# Patient Record
Sex: FEMALE | Race: WHITE | Employment: OTHER | URBAN - METROPOLITAN AREA
[De-identification: names, ages, dates, MRNs, and addresses within clinical notes are randomized per-mention and may not be internally consistent; named-entity substitution may affect disease eponyms.]

---

## 2020-08-11 ENCOUNTER — HOSPITAL ENCOUNTER (OUTPATIENT)
Age: 50
Setting detail: SPECIMEN
Discharge: HOME | End: 2020-08-11
Payer: COMMERCIAL

## 2020-08-11 ENCOUNTER — OFFICE VISIT (OUTPATIENT)
Dept: PRIMARY CARE CLINIC | Age: 50
End: 2020-08-11
Payer: COMMERCIAL

## 2020-08-11 VITALS — OXYGEN SATURATION: 99 % | TEMPERATURE: 97 F | HEART RATE: 76 BPM

## 2020-08-11 PROCEDURE — 99213 OFFICE O/P EST LOW 20 MIN: CPT | Performed by: NURSE PRACTITIONER

## 2020-08-11 PROCEDURE — U0002 COVID-19 LAB TEST NON-CDC: HCPCS

## 2020-08-11 NOTE — PATIENT INSTRUCTIONS
Your COVID 19 test can take 3-5 days for the results come back. We ask that you make a Mychart page and view your test results this way. You will need to Self quarantine until you know your results. Increase fluids rest  Saline nasal spray as directed  Warm salt gargles for throat discomfort  Monitor temperature twice a day  Tylenol for fevers and/or discomfort. If symptoms are worse -Go to the ER. Follow up with your primary doctor    To Whom it May Concern:    Bashir Reed has been tested for COVID on 08/11/20. They may NOT return to work until their lab test results back and they been fever free for 3 days. If test is positive they must stay home for 2 weeks or until they test negative or as directed by the [facility] Department.

## 2020-08-11 NOTE — PROGRESS NOTES
8/11/2020    HPI:  Chief complaint and history of present illness as per medical assistant/nurse documented today in the Flu/COVID-19 clinic. MEDICATIONS:  Prior to Visit Medications    Not on File       Allergies not on file, No past medical history on file., No past surgical history on file.,   Social History     Tobacco Use    Smoking status: Not on file   Substance Use Topics    Alcohol use: Not on file    Drug use: Not on file   , No family history on file.,   There is no immunization history on file for this patient.,   Health Maintenance   Topic Date Due    HIV screen  04/29/1985    DTaP/Tdap/Td vaccine (1 - Tdap) 04/29/1989    Cervical cancer screen  04/29/1991    Breast cancer screen  04/29/2020    Shingles Vaccine (1 of 2) 04/29/2020    Colon cancer screen colonoscopy  04/29/2020    Lipid screen  06/02/2020    Flu vaccine (1) 09/01/2020    Hepatitis A vaccine  Aged Out    Hepatitis B vaccine  Aged Out    Hib vaccine  Aged Out    Meningococcal (ACWY) vaccine  Aged Out    Pneumococcal 0-64 years Vaccine  Aged Out       PHYSICAL EXAM:  Physical Exam  Constitutional:       Appearance: Normal appearance. HENT:      Head: Normocephalic. Right Ear: Tympanic membrane, ear canal and external ear normal.      Left Ear: Tympanic membrane, ear canal and external ear normal.      Nose: Nose normal.      Mouth/Throat:      Lips: Pink. Mouth: Mucous membranes are moist.      Pharynx: Oropharynx is clear. Neck:      Musculoskeletal: Neck supple. Cardiovascular:      Rate and Rhythm: Normal rate and regular rhythm. Heart sounds: Normal heart sounds. Pulmonary:      Effort: Pulmonary effort is normal.      Breath sounds: Normal breath sounds. Skin:     General: Skin is warm and dry. Neurological:      Mental Status: She is alert and oriented to person, place, and time.    Psychiatric:         Mood and Affect: Mood normal.         Behavior: Behavior normal. ASSESSMENT/PLAN:  1. Flu-like symptoms  Your COVID 19 test can take 3-5 days for the results come back. We ask that you make a Mychart page and view your test results this way. You will need to Self quarantine until you know your results. Increase fluids rest  Saline nasal spray as directed  Warm salt gargles for throat discomfort  Monitor temperature twice a day  Tylenol for fevers and/or discomfort. If symptoms are worse -Go to the ER. Follow up with your primary doctor    To Whom it May Concern:    Marly Schultz has been tested for COVID on 08/11/20. They may NOT return to work until their lab test results back and they been fever free for 3 days. If test is positive they must stay home for 2 weeks or until they test negative or as directed by the [facility] Department. COVID - 19 Ambulatory      FOLLOW-UP:  Return if symptoms worsen or fail to improve.     In addition to other information, the printed after visit summary provided to the patient includes:  [x] COVID-19 Self care instructions  [x] COVID-19 General patient information

## 2020-08-12 LAB
SARS-COV-2: NOT DETECTED
SOURCE: NORMAL

## 2021-04-26 ENCOUNTER — HOSPITAL ENCOUNTER (OUTPATIENT)
Age: 51
Discharge: HOME | End: 2021-04-26
Payer: COMMERCIAL

## 2021-04-26 LAB
ALBUMIN SERPL-MCNC: 4.8 GM/DL (ref 3.4–5)
ALP BLD-CCNC: 70 IU/L (ref 40–129)
ALT SERPL-CCNC: 45 U/L (ref 10–40)
ANION GAP SERPL CALCULATED.3IONS-SCNC: 9 MMOL/L (ref 4–16)
AST SERPL-CCNC: 25 IU/L (ref 15–37)
BASOPHILS ABSOLUTE: 0 K/CU MM
BASOPHILS RELATIVE PERCENT: 0.3 % (ref 0–1)
BILIRUB SERPL-MCNC: 0.4 MG/DL (ref 0–1)
BUN BLDV-MCNC: 10 MG/DL (ref 6–23)
CALCIUM SERPL-MCNC: 9.7 MG/DL (ref 8.3–10.6)
CHLORIDE BLD-SCNC: 103 MMOL/L (ref 99–110)
CO2: 28 MMOL/L (ref 21–32)
CREAT SERPL-MCNC: 0.6 MG/DL (ref 0.6–1.1)
DIFFERENTIAL TYPE: ABNORMAL
EOSINOPHILS ABSOLUTE: 0.2 K/CU MM
EOSINOPHILS RELATIVE PERCENT: 3 % (ref 0–3)
GFR AFRICAN AMERICAN: >60 ML/MIN/1.73M2
GFR NON-AFRICAN AMERICAN: >60 ML/MIN/1.73M2
GLUCOSE BLD-MCNC: 90 MG/DL (ref 70–99)
HCT VFR BLD CALC: 43.2 % (ref 37–47)
HEMOGLOBIN: 14.3 GM/DL (ref 12.5–16)
IMMATURE NEUTROPHIL %: 0.3 % (ref 0–0.43)
LYMPHOCYTES ABSOLUTE: 2.4 K/CU MM
LYMPHOCYTES RELATIVE PERCENT: 39.2 % (ref 24–44)
MCH RBC QN AUTO: 29.4 PG (ref 27–31)
MCHC RBC AUTO-ENTMCNC: 33.1 % (ref 32–36)
MCV RBC AUTO: 88.7 FL (ref 78–100)
MONOCYTES ABSOLUTE: 0.5 K/CU MM
MONOCYTES RELATIVE PERCENT: 7.8 % (ref 0–4)
NUCLEATED RBC %: 0 %
PDW BLD-RTO: 12.6 % (ref 11.7–14.9)
PLATELET # BLD: 264 K/CU MM (ref 140–440)
PMV BLD AUTO: 11.4 FL (ref 7.5–11.1)
POTASSIUM SERPL-SCNC: 4.2 MMOL/L (ref 3.5–5.1)
RBC # BLD: 4.87 M/CU MM (ref 4.2–5.4)
SEGMENTED NEUTROPHILS ABSOLUTE COUNT: 3 K/CU MM
SEGMENTED NEUTROPHILS RELATIVE PERCENT: 49.4 % (ref 36–66)
SODIUM BLD-SCNC: 140 MMOL/L (ref 135–145)
T3 FREE: 2.9 PG/ML (ref 2.3–4.2)
TOTAL IMMATURE NEUTOROPHIL: 0.02 K/CU MM
TOTAL NUCLEATED RBC: 0 K/CU MM
TOTAL PROTEIN: 7.1 GM/DL (ref 6.4–8.2)
TSH HIGH SENSITIVITY: 1.28 UIU/ML (ref 0.27–4.2)
TSH SERPL DL<=0.05 MIU/L-ACNC: 1.28 UIU/ML
TSH SERPL DL<=0.05 MIU/L-ACNC: 1.28 UIU/ML
WBC # BLD: 6 K/CU MM (ref 4–10.5)

## 2021-04-26 PROCEDURE — 84443 ASSAY THYROID STIM HORMONE: CPT

## 2021-04-26 PROCEDURE — 36415 COLL VENOUS BLD VENIPUNCTURE: CPT

## 2021-04-26 PROCEDURE — 84481 FREE ASSAY (FT-3): CPT

## 2021-04-26 PROCEDURE — 85025 COMPLETE CBC W/AUTO DIFF WBC: CPT

## 2021-04-26 PROCEDURE — 80053 COMPREHEN METABOLIC PANEL: CPT

## 2021-04-26 PROCEDURE — 82607 VITAMIN B-12: CPT

## 2021-04-26 PROCEDURE — 84436 ASSAY OF TOTAL THYROXINE: CPT

## 2021-04-29 LAB
T4 TOTAL: 8.71 UG/DL (ref 5.1–14.1)
VITAMIN B-12: 1589 PG/ML (ref 211–911)

## 2021-05-04 ENCOUNTER — HOSPITAL ENCOUNTER (OUTPATIENT)
Age: 51
Discharge: HOME | End: 2021-05-04
Payer: COMMERCIAL

## 2021-05-04 LAB
ANION GAP SERPL CALCULATED.3IONS-SCNC: 8 MMOL/L (ref 4–16)
BUN BLDV-MCNC: 11 MG/DL
BUN BLDV-MCNC: 11 MG/DL (ref 6–23)
CALCIUM SERPL-MCNC: 9.1 MG/DL
CALCIUM SERPL-MCNC: 9.1 MG/DL (ref 8.3–10.6)
CHLORIDE BLD-SCNC: 106 MMOL/L
CHLORIDE BLD-SCNC: 106 MMOL/L (ref 99–110)
CO2: 27 MMOL/L
CO2: 27 MMOL/L (ref 21–32)
CREAT SERPL-MCNC: 0.6 MG/DL
CREAT SERPL-MCNC: 0.6 MG/DL (ref 0.6–1.1)
D DIMER: <200 NG/ML(DDU)
GFR AFRICAN AMERICAN: >60 ML/MIN/1.73M2
GFR CALCULATED: NORMAL
GFR NON-AFRICAN AMERICAN: >60 ML/MIN/1.73M2
GLUCOSE BLD-MCNC: 85 MG/DL
GLUCOSE BLD-MCNC: 85 MG/DL (ref 70–99)
POTASSIUM SERPL-SCNC: 4.3 MMOL/L
POTASSIUM SERPL-SCNC: 4.3 MMOL/L (ref 3.5–5.1)
PRO-BNP: 67.33 PG/ML
SODIUM BLD-SCNC: 141 MMOL/L
SODIUM BLD-SCNC: 141 MMOL/L (ref 135–145)
TOTAL CK: 54 IU/L (ref 26–140)
TROPONIN T: <0.01 NG/ML

## 2021-05-04 PROCEDURE — 80048 BASIC METABOLIC PNL TOTAL CA: CPT

## 2021-05-04 PROCEDURE — 84484 ASSAY OF TROPONIN QUANT: CPT

## 2021-05-04 PROCEDURE — 85379 FIBRIN DEGRADATION QUANT: CPT

## 2021-05-04 PROCEDURE — 82550 ASSAY OF CK (CPK): CPT

## 2021-05-04 PROCEDURE — 36415 COLL VENOUS BLD VENIPUNCTURE: CPT

## 2021-05-04 PROCEDURE — 83880 ASSAY OF NATRIURETIC PEPTIDE: CPT

## 2021-07-08 ENCOUNTER — HOSPITAL ENCOUNTER (OUTPATIENT)
Age: 51
Discharge: HOME | End: 2021-07-08
Payer: COMMERCIAL

## 2021-07-08 LAB
ALBUMIN SERPL-MCNC: 4.7 GM/DL (ref 3.4–5)
ALP BLD-CCNC: 72 IU/L (ref 40–129)
ALT SERPL-CCNC: 48 U/L (ref 10–40)
AST SERPL-CCNC: 29 IU/L (ref 15–37)
BACTERIA: NEGATIVE /HPF
BILIRUB SERPL-MCNC: 0.3 MG/DL (ref 0–1)
BILIRUBIN DIRECT: 0.2 MG/DL (ref 0–0.3)
BILIRUBIN URINE: NEGATIVE MG/DL
BILIRUBIN, INDIRECT: 0.1 MG/DL (ref 0–0.7)
BLOOD, URINE: ABNORMAL
CHOLESTEROL: 292 MG/DL
CLARITY: CLEAR
COLOR: YELLOW
ERYTHROCYTE SEDIMENTATION RATE: 40 MM/HR (ref 0–30)
FOLATE: 15.2 NG/ML (ref 3.1–17.5)
GLUCOSE, URINE: NEGATIVE MG/DL
HDLC SERPL-MCNC: 118 MG/DL
KETONES, URINE: NEGATIVE MG/DL
LDL CHOLESTEROL DIRECT: 157 MG/DL
LEUKOCYTE ESTERASE, URINE: ABNORMAL
NITRITE URINE, QUANTITATIVE: NEGATIVE
PH, URINE: 5 (ref 5–8)
PROTEIN UA: NEGATIVE MG/DL
RBC URINE: 1 /HPF (ref 0–6)
SPECIFIC GRAVITY UA: 1.01 (ref 1–1.03)
SQUAMOUS EPITHELIAL: 1 /HPF
TOTAL CK: 80 IU/L (ref 26–140)
TOTAL PROTEIN: 6.7 GM/DL (ref 6.4–8.2)
TRANSITIONAL EPITHELIAL: <1 /HPF
TRICHOMONAS: ABNORMAL /HPF
TRIGL SERPL-MCNC: 60 MG/DL
UROBILINOGEN, URINE: NEGATIVE MG/DL (ref 0.2–1)
VITAMIN B-12: 1184 PG/ML (ref 211–911)
WBC UA: 2 /HPF (ref 0–5)

## 2021-07-08 PROCEDURE — 80061 LIPID PANEL: CPT

## 2021-07-08 PROCEDURE — 80076 HEPATIC FUNCTION PANEL: CPT

## 2021-07-08 PROCEDURE — 82607 VITAMIN B-12: CPT

## 2021-07-08 PROCEDURE — 83721 ASSAY OF BLOOD LIPOPROTEIN: CPT

## 2021-07-08 PROCEDURE — 82746 ASSAY OF FOLIC ACID SERUM: CPT

## 2021-07-08 PROCEDURE — 82550 ASSAY OF CK (CPK): CPT

## 2021-07-08 PROCEDURE — 85652 RBC SED RATE AUTOMATED: CPT

## 2021-07-08 PROCEDURE — 36415 COLL VENOUS BLD VENIPUNCTURE: CPT

## 2021-07-08 PROCEDURE — 81001 URINALYSIS AUTO W/SCOPE: CPT

## 2022-08-15 ENCOUNTER — OFFICE VISIT (OUTPATIENT)
Dept: CARDIOLOGY CLINIC | Age: 52
End: 2022-08-15
Payer: COMMERCIAL

## 2022-08-15 VITALS
HEART RATE: 54 BPM | HEIGHT: 63 IN | SYSTOLIC BLOOD PRESSURE: 130 MMHG | WEIGHT: 191.8 LBS | BODY MASS INDEX: 33.98 KG/M2 | DIASTOLIC BLOOD PRESSURE: 84 MMHG

## 2022-08-15 DIAGNOSIS — Z53.20 NEW PATIENT SCREENING DECLINED: Primary | ICD-10-CM

## 2022-08-15 DIAGNOSIS — M79.89 LEG SWELLING: ICD-10-CM

## 2022-08-15 PROCEDURE — 93000 ELECTROCARDIOGRAM COMPLETE: CPT | Performed by: INTERNAL MEDICINE

## 2022-08-15 PROCEDURE — 99203 OFFICE O/P NEW LOW 30 MIN: CPT | Performed by: INTERNAL MEDICINE

## 2022-08-15 RX ORDER — DIPHENHYDRAMINE HYDROCHLORIDE 25 MG/1
TABLET ORAL
COMMUNITY

## 2022-08-15 RX ORDER — FLUTICASONE PROPIONATE 50 MCG
1 SPRAY, SUSPENSION (ML) NASAL DAILY
COMMUNITY

## 2022-08-15 NOTE — PATIENT INSTRUCTIONS
Please be informed that if you contact our office outside of normal business hours the physician on call cannot help with any scheduling or rescheduling issues, procedure instruction questions or any type of medication issue. We advise you for any urgent/emergency that you go to the nearest emergency room! PLEASE CALL OUR OFFICE DURING NORMAL BUSINESS HOURS    Monday - Friday   8 am to 5 pm    [place]: [address]: [phone]    [place]:  [phone]    **It is YOUR responsibilty to bring medication bottles and/or updated medication list to [address] [place] [address].  This will allow us to better serve you and all your healthcare needs**

## 2022-08-15 NOTE — PROGRESS NOTES
CARDIOLOGY CONSULT NOTE   Reason for consultation:  [address]    Referring physician:  Jacque Wagner MD    Primary care physician: Jacque Wagner MD      Dear Jacque Wagner MD  Thanks for the consult. History of present illness:Laly is a 46 y. o.year old who  presents with BRADYCARDIA and tiredness,she had nuclear stress test , her echo was normal, her resting heart rate is 54, she denied chest pain, and shortness of breath,she has vaircose vein, she has records of her echo and stress test from 's office, hwoever stress test resport is incomplete, her echo shwoed mild MR and normal LVEF, and  milddiastolic dysfunction  Chief Complaint   Patient presents with    New Patient     Pt denies cardiac symptoms     Blood pressure, cholesterol, blood glucose and weight are well controlled. Past medical history:   Mild MR  Past surgical history:  NONE  Social History:     Family history:   no family history of CAD, STROKE of DM    No Known Allergies    No current facility-administered medications for this visit. Current Outpatient Medications   Medication Sig Dispense Refill    Biotin 5 MG CAPS Take one capsule daily by mouth      Multiple Vitamins-Minerals (WOMENS 50+ MULTI VITAMIN/MIN) TABS Take by mouth      fluticasone (FLONASE) 50 MCG/ACT nasal spray 1 spray by Each Nostril route daily       No current facility-administered medications for this visit.      Review of Systems:   Constitutional: No Fever or Weight Loss   Eyes: No Decreased Vision  ENT: No Headaches, Hearing Loss or Vertigo  Cardiovascular: No chest pain, dyspnea on exertion, palpitations or loss of consciousness  Respiratory: No cough or wheezing    Gastrointestinal: No abdominal pain, appetite loss, blood in stools, constipation, diarrhea or heartburn  Genitourinary: No dysuria, trouble voiding, or hematuria  Musculoskeletal:  No gait disturbance, weakness or joint complaints  Integumentary: No rash or pruritis  Neurological: No TIA or stroke symptoms  Psychiatric: No anxiety or depression  Endocrine: No malaise, fatigue or temperature intolerance  Hematologic/Lymphatic: No bleeding problems, blood clots or swollen lymph nodes  Allergic/Immunologic: No nasal congestion or hives  All systems negative except as marked. Physical Examination:    Vitals:    08/15/22 1105   BP: 130/84   Pulse: 54    Rr 124  afebrile  Wt Readings from Last 3 Encounters:   08/15/22 191 lb 12.8 oz (87 kg)     Body mass index is 33.98 kg/m². General Appearance:  No distress, conversant    Constitutional:  Well developed, Well nourished, No acute distress, Non-toxic appearance. HENT:  Normocephalic, Atraumatic, Bilateral external ears normal, Oropharynx moist, No oral exudates, Nose normal. Neck- Normal range of motion, No tenderness, Supple, No stridor,no apical-carotid delay, no carotid bruit  Eyes:  PERRL, EOMI, Conjunctiva normal, No discharge. Respiratory:  Normal breath sounds, No respiratory distress, No wheezing, No chest tenderness. ,no use of accessory muscles, diaphragm movement is normal  Cardiovascular: (PMI) apex non displaced,no lifts no thrills, no s3,no s4, Normal heart rate, Normal rhythm, No murmurs, No rubs, No gallops. Carotid arteries pulse and amplitude are normal no bruit, no abdominal bruit noted ( normal abdominal aorta ausculation), femoral arteries pulse and amplitude are normal no bruit, pedal pulses are normal  GI:  Bowel sounds normal, Soft, No tenderness, No masses, No pulsatile masses, no hepatosplenomegally, no bruits  : External genitalia appear normal, No masses or lesions. No discharge. No CVA tenderness. Musculoskeletal:  Intact distal pulses, No edema, No tenderness, No cyanosis, No clubbing. Good range of motion in all major joints. No tenderness to palpation or major deformities noted. Back- No tenderness. Integument:  Warm, Dry, No erythema, No rash.    Skin: no rash, no ulcers  Lymphatic:  No lymphadenopathy noted. Neurologic:  Alert & oriented x 3, Normal motor function, Normal sensory function, No focal deficits noted. Psychiatric:  Affect normal, Judgment normal, Mood normal.   Lab Review   No results for input(s): WBC, HGB, HCT, PLT in the last 72 hours. No results for input(s): NA, K, CL, CO2, PHOS, BUN, CREATININE, CA in the last 72 hours. No results for input(s): AST, ALT, ALB, BILIDIR, BILITOT, ALKPHOS in the last 72 hours. No results for input(s): TROPONINI in the last 72 hours. No results found for: BNP  No results found for: INR, PROTIME      EKG:SINUS BRADYCARDIA    Chest Xray:    ECHO:  Labs, echo, meds reviewed  Assessment: 46 y. o.year old with PMH of Mild MR and diastolic dysfunction      Recommendations:    BRADYCARDIA and tiredness\" last yr her tsh, t4 was normal, recommend to check sleep apnea, echo is reviewed, stress test is also reviewed, it was lexiscan, however, nuclear perfusion is not reported, will get treatmill test to check to chronoropic incompetence and will get records from Dr. Nicho Bhakta office for perfusion scan results  Diastolic dysfunction on echo: stable, recommend control BP and weight    All labs, medications and tests reviewed, continue all other medications of all above medical condition listed as is.          Archie Mirza MD, 8/15/2022 11:47 AM

## 2022-09-12 ENCOUNTER — PROCEDURE VISIT (OUTPATIENT)
Dept: CARDIOLOGY CLINIC | Age: 52
End: 2022-09-12
Payer: COMMERCIAL

## 2022-09-12 DIAGNOSIS — Z53.20 NEW PATIENT SCREENING DECLINED: ICD-10-CM

## 2022-09-12 DIAGNOSIS — M79.89 LEG SWELLING: ICD-10-CM

## 2022-09-12 PROCEDURE — 93015 CV STRESS TEST SUPVJ I&R: CPT | Performed by: INTERNAL MEDICINE

## 2022-09-12 PROCEDURE — 93970 EXTREMITY STUDY: CPT | Performed by: INTERNAL MEDICINE

## 2022-09-15 ENCOUNTER — OFFICE VISIT (OUTPATIENT)
Dept: CARDIOLOGY CLINIC | Age: 52
End: 2022-09-15
Payer: COMMERCIAL

## 2022-09-15 ENCOUNTER — NURSE ONLY (OUTPATIENT)
Dept: CARDIOLOGY CLINIC | Age: 52
End: 2022-09-15
Payer: COMMERCIAL

## 2022-09-15 VITALS
HEIGHT: 63 IN | SYSTOLIC BLOOD PRESSURE: 114 MMHG | HEART RATE: 60 BPM | BODY MASS INDEX: 33.31 KG/M2 | WEIGHT: 188 LBS | DIASTOLIC BLOOD PRESSURE: 86 MMHG

## 2022-09-15 DIAGNOSIS — I87.2 VENOUS REFLUX: Primary | ICD-10-CM

## 2022-09-15 DIAGNOSIS — R53.83 FATIGUE, UNSPECIFIED TYPE: ICD-10-CM

## 2022-09-15 DIAGNOSIS — R00.1 BRADYCARDIA: ICD-10-CM

## 2022-09-15 DIAGNOSIS — E66.09 CLASS 1 OBESITY DUE TO EXCESS CALORIES WITHOUT SERIOUS COMORBIDITY WITH BODY MASS INDEX (BMI) OF 33.0 TO 33.9 IN ADULT: ICD-10-CM

## 2022-09-15 DIAGNOSIS — I87.2 VENOUS REFLUX: ICD-10-CM

## 2022-09-15 DIAGNOSIS — R00.1 BRADYCARDIA: Primary | ICD-10-CM

## 2022-09-15 PROBLEM — E66.811 CLASS 1 OBESITY DUE TO EXCESS CALORIES WITHOUT SERIOUS COMORBIDITY WITH BODY MASS INDEX (BMI) OF 33.0 TO 33.9 IN ADULT: Status: ACTIVE | Noted: 2022-09-15

## 2022-09-15 PROCEDURE — 99214 OFFICE O/P EST MOD 30 MIN: CPT | Performed by: NURSE PRACTITIONER

## 2022-09-15 PROCEDURE — 93225 XTRNL ECG REC<48 HRS REC: CPT | Performed by: INTERNAL MEDICINE

## 2022-09-15 RX ORDER — EPINEPHRINE 0.15 MG/.15ML
INJECTION SUBCUTANEOUS
COMMUNITY
Start: 2022-08-03

## 2022-09-15 ASSESSMENT — ENCOUNTER SYMPTOMS: SHORTNESS OF BREATH: 0

## 2022-09-15 NOTE — PROGRESS NOTES
[place] [facility] [place]    [address] [facility] [address]  Phone: [phone]    Fax [phone]                  Luke Villavicencio MD, Haseeb Mazariegos MD, Talha Holland MD, MD Eduarda Acevedo MD, Vaishnavi Galo MD, Sal Vergara MD, Tammy Arellano, DEWAYNE       [facility] Son, DEWAYNE  [facility], [facility], [facility]        Cardiology Progress Note      9/15/2022    RE: Tonio Nicolas  (1970)                             Primary cardiologist: Dr. Eduarda Gee       Subjective:  CC:   1. Venous reflux    2. Bradycardia    3. Class 1 obesity due to excess calories without serious comorbidity with body mass index (BMI) of 33.0 to 33.9 in adult    4. Fatigue, unspecified type        HPI: Tonio Nicolas, who is a  46y.o. year old female with a past medical history as listed below. Patient presents to the office for follow up on venous reflux, bradycardia, and obesity. Patient complains of increased fatigue and low heart rate at home. She had COVID 19 infection last year resulting in prolonged fatigue. She reports the report improved after 6 months but now has returned. She has had multiple thyroid work-ups through PCP which were normal.  Last year she had a stress test and echo at [facility] office which were negative. Patient denies any chest pain, shortness of breath, dizziness, syncope, or palpitations.     Past Medical History:   Diagnosis Date    History of stress test 05/11/2021    Hx of echocardiogram 04/30/2021    EF 55%, Mild to moderate MR       Current Outpatient Medications   Medication Sig Dispense Refill    Multiple Vitamins-Minerals (WOMENS 50+ MULTI VITAMIN/MIN) TABS Take by mouth      fluticasone (FLONASE) 50 MCG/ACT nasal spray 1 spray by Each Nostril route daily      EPINEPHrine (ADRENACLICK) 2.57 FI/5.53AU SOAJ injection       Biotin 5 MG CAPS Take one capsule daily by mouth (Patient not taking: Reported on 9/15/2022)       No current facility-administered medications for this visit. Review of Systems:  Review of Systems   Constitutional:  Positive for fatigue. Respiratory:  Negative for shortness of breath. Cardiovascular:  Negative for chest pain, palpitations and leg swelling. Musculoskeletal: Negative. Skin: Negative. Neurological:  Negative for dizziness and weakness. All other systems reviewed and are negative. Objective:      Physical Exam:  /86   Pulse 60   Ht 5' 3\" (1.6 m)   Wt 188 lb (85.3 kg)   BMI 33.30 kg/m²   Wt Readings from Last 3 Encounters:   09/15/22 188 lb (85.3 kg)   08/15/22 191 lb 12.8 oz (87 kg)     Body mass index is 33.3 kg/m². Physical exam:  Physical Exam  Constitutional:       Appearance: She is well-developed. Cardiovascular:      Rate and Rhythm: Normal rate and regular rhythm. Pulses: Intact distal pulses. Dorsalis pedis pulses are 2+ on the right side and 2+ on the left side. Posterior tibial pulses are 2+ on the right side and 2+ on the left side. Heart sounds: Normal heart sounds, S1 normal and S2 normal.   Pulmonary:      Effort: Pulmonary effort is normal.      Breath sounds: Normal breath sounds. Musculoskeletal:         General: Normal range of motion. Skin:     General: Skin is warm and dry. Neurological:      Mental Status: She is alert and oriented to person, place, and time.         DATA:  Lab Results   Component Value Date/Time    CKTOTAL 80 07/08/2021 11:23 AM     BNP:  No results found for: BNP  PT/INR:  No results found for: PTINR  No results found for: LABA1C  Lab Results   Component Value Date    CHOL 292 (H) 07/08/2021    TRIG 60 07/08/2021     07/08/2021    LDLDIRECT 157 (H) 07/08/2021     Lab Results   Component Value Date    ALT 48 (H) 07/08/2021    AST 29 07/08/2021     TSH:    Lab Results   Component Value Date/Time    TSH 1.280 04/26/2021 12:00 AM    TSH 1.280 04/26/2021 12:00 AM       Vitals:    09/15/22 0704   BP: 114/86   Pulse: 60       Echo:   4/30/2021 EF 55%, mild-moderate mitral regurgitation, diastolic dysfunction. Stress Test:8/19/22  Normal sinus rhythm, V5, V6 had ST depression, precordial leads had upsloping ST depression. The ASCVD Risk score (Misty Arcos., et al., 2013) failed to calculate for the following reasons: The valid HDL cholesterol range is 20 to 100 mg/dL      Assessment/ Plan:     Venous reflux  -Significant reflux of right GSV knee, GSV mid calf and tributary. Chronic occlusive SVT of bilateral GSV distal calf. Torturous tributary of right GSV at mid calf. Significant reflux of left CFV. -Recommend compression stockings. Bradycardia   - Patient had treadmill to assess for chronotropic incompetence. Patient met target heart rate.    -Similar treadmill to previous Dr. Tia Borden office. She then had normal nuc med. Images not available at this time.     -She reports HR in 40's at home.     -Will get NM and 48 hour monitor. Class 1 obesity due to excess calories without serious comorbidity with body mass index (BMI) of 33.0 to 33.9 in adult   -Discussed the importance of diet and exercise and assisting with weight loss. Patient informed of ideal body weight and high risk mortality associated with obesity. Patient voices understanding. Fatigue  -Hx of prolonged COVID fatigue. Thyroid studies in the past have been normal.  Echocardiogram EF intact last year with mild mitral regurgitation. Discussed options of cardiac CTA but patient has anaphylactic reaction to iodine. Cardiac risk is low. Given similar treadmill results last year discussed options of conservative watch and wait versus proceeding with nuclear med. Patient agrees to repeat nuc med. Patient seen, interviewed and examined. Testing was reviewed. Patient is encouraged to exercise even a brisk walk for 30 minutes at least 3 to 4 times a week. Lifestyle and risk factor modificatons discussed. Various goals are discussed and questions answered. Continue current medications. Appropriate prescriptions are addressed. Questions answered and patient verbalizes understanding. Call for any problems, questions, or concerns. Pt is to follow up in 1 months for Cardiac management    Electronically signed by DEWAYNE Yeager CNP on 9/15/2022 at 7:48 AM

## 2022-09-15 NOTE — PROGRESS NOTES
Applied @ 8, 48hr holter w/monitor# [id number] for Dx of bradycardia. Educated pt on proper holter usage; how to keep sx diary; & when to bring monitor back to office. Pt voiced understanding. Holter order,including monitor & card#, & time started, to front nurse's station in 's in-box.

## 2022-09-15 NOTE — PATIENT INSTRUCTIONS
list to [address] [facility]. This will allow us to better serve you and all your healthcare needs**  [facility] Laboratory Locations - No appointment necessary. Sites open Monday to Friday. Call your preferred location for test preparation, business hours and other information you need. [employer] accepts [employer]. [facility]   [facility] [address] Adonay Siddiqi, [address] [facility]  Phone: [phone] [facility] [address] [facility]  [address].   Dai Ramirez, [address] [facility]  Phone: [phone]

## 2022-09-15 NOTE — ASSESSMENT & PLAN NOTE
- Patient had treadmill to assess for chronotropic incompetence. Patient met target heart rate.    -Similar treadmill to previous Dr. Nella Reinoso office. She then had normal nuc med. Images not available at this time.    -Will get NM and 48 hour monitor.

## 2022-09-16 ENCOUNTER — TELEPHONE (OUTPATIENT)
Dept: CARDIOLOGY CLINIC | Age: 52
End: 2022-09-16

## 2022-09-17 PROCEDURE — 93227 XTRNL ECG REC<48 HR R&I: CPT | Performed by: INTERNAL MEDICINE

## 2022-10-04 ENCOUNTER — TELEPHONE (OUTPATIENT)
Dept: CARDIOLOGY CLINIC | Age: 52
End: 2022-10-04

## 2022-10-04 NOTE — TELEPHONE ENCOUNTER
This 48-hour Holter monitor basic alignment sinus minimum heart rate is 45 bpm maximum heart rate is 122 bpm average heart rate 72 bpm there were 11 PVCs and 10 APCs noted, this monitor was done for bradycardia, I recommend to continue present care    Spoke to patient regarding results of holter monitor. Patient voiced understanding.

## 2022-12-19 ENCOUNTER — TELEPHONE (OUTPATIENT)
Dept: CARDIOLOGY CLINIC | Age: 52
End: 2022-12-19

## 2022-12-19 NOTE — TELEPHONE ENCOUNTER
Patient called to se if we have any dietary information that she can  to help her get a good start on health eating

## 2023-02-24 ENCOUNTER — HOSPITAL ENCOUNTER (OUTPATIENT)
Age: 53
Discharge: HOME | End: 2023-02-24
Payer: COMMERCIAL

## 2023-02-24 LAB
25(OH)D3 SERPL-MCNC: 31.54 NG/ML
ALBUMIN SERPL-MCNC: 4.6 GM/DL (ref 3.4–5)
ALP BLD-CCNC: 68 IU/L (ref 40–128)
ALT SERPL-CCNC: 16 U/L (ref 10–40)
ANION GAP SERPL CALCULATED.3IONS-SCNC: 8 MMOL/L (ref 4–16)
AST SERPL-CCNC: 16 IU/L (ref 15–37)
BASOPHILS ABSOLUTE: 0 K/CU MM
BASOPHILS RELATIVE PERCENT: 0.4 % (ref 0–1)
BILIRUB SERPL-MCNC: 0.4 MG/DL (ref 0–1)
BUN SERPL-MCNC: 12 MG/DL (ref 6–23)
CALCIUM SERPL-MCNC: 9 MG/DL (ref 8.3–10.6)
CHLORIDE BLD-SCNC: 104 MMOL/L (ref 99–110)
CHOLEST SERPL-MCNC: 247 MG/DL
CO2: 27 MMOL/L (ref 21–32)
CREAT SERPL-MCNC: 0.5 MG/DL (ref 0.6–1.1)
DIFFERENTIAL TYPE: ABNORMAL
EOSINOPHILS ABSOLUTE: 0.2 K/CU MM
EOSINOPHILS RELATIVE PERCENT: 2.8 % (ref 0–3)
GFR SERPL CREATININE-BSD FRML MDRD: >60 ML/MIN/1.73M2
GLUCOSE SERPL-MCNC: 90 MG/DL (ref 70–99)
HCT VFR BLD CALC: 42 % (ref 37–47)
HCV AB SERPL QL IA: NON REACTIVE
HDLC SERPL-MCNC: 88 MG/DL
HEMOGLOBIN: 13.8 GM/DL (ref 12.5–16)
IMMATURE NEUTROPHIL %: 0.4 % (ref 0–0.43)
LDLC SERPL CALC-MCNC: 141 MG/DL
LYMPHOCYTES ABSOLUTE: 1.9 K/CU MM
LYMPHOCYTES RELATIVE PERCENT: 32.7 % (ref 24–44)
MCH RBC QN AUTO: 28.8 PG (ref 27–31)
MCHC RBC AUTO-ENTMCNC: 32.9 % (ref 32–36)
MCV RBC AUTO: 87.7 FL (ref 78–100)
MONOCYTES ABSOLUTE: 0.5 K/CU MM
MONOCYTES RELATIVE PERCENT: 9.5 % (ref 0–4)
NUCLEATED RBC %: 0 %
PDW BLD-RTO: 13 % (ref 11.7–14.9)
PLATELET # BLD: 271 K/CU MM (ref 140–440)
PMV BLD AUTO: 10.8 FL (ref 7.5–11.1)
POTASSIUM SERPL-SCNC: 4.5 MMOL/L (ref 3.5–5.1)
RBC # BLD: 4.79 M/CU MM (ref 4.2–5.4)
SEGMENTED NEUTROPHILS ABSOLUTE COUNT: 3.1 K/CU MM
SEGMENTED NEUTROPHILS RELATIVE PERCENT: 54.2 % (ref 36–66)
SODIUM BLD-SCNC: 139 MMOL/L (ref 135–145)
TOTAL IMMATURE NEUTOROPHIL: 0.02 K/CU MM
TOTAL NUCLEATED RBC: 0 K/CU MM
TOTAL PROTEIN: 6.7 GM/DL (ref 6.4–8.2)
TRIGL SERPL-MCNC: 92 MG/DL
WBC # BLD: 5.7 K/CU MM (ref 4–10.5)

## 2023-02-24 PROCEDURE — 80053 COMPREHEN METABOLIC PANEL: CPT

## 2023-02-24 PROCEDURE — 85025 COMPLETE CBC W/AUTO DIFF WBC: CPT

## 2023-02-24 PROCEDURE — 36415 COLL VENOUS BLD VENIPUNCTURE: CPT

## 2023-02-24 PROCEDURE — 80061 LIPID PANEL: CPT

## 2023-02-24 PROCEDURE — 86803 HEPATITIS C AB TEST: CPT

## 2023-02-24 PROCEDURE — 82306 VITAMIN D 25 HYDROXY: CPT

## 2023-10-02 ENCOUNTER — TELEPHONE (OUTPATIENT)
Dept: CARDIOLOGY CLINIC | Age: 53
End: 2023-10-02

## 2023-11-15 ENCOUNTER — OFFICE VISIT (OUTPATIENT)
Dept: CARDIOLOGY CLINIC | Age: 53
End: 2023-11-15
Payer: COMMERCIAL

## 2023-11-15 VITALS
SYSTOLIC BLOOD PRESSURE: 122 MMHG | WEIGHT: 206 LBS | HEART RATE: 55 BPM | BODY MASS INDEX: 36.5 KG/M2 | DIASTOLIC BLOOD PRESSURE: 76 MMHG | HEIGHT: 63 IN

## 2023-11-15 DIAGNOSIS — E66.09 CLASS 1 OBESITY DUE TO EXCESS CALORIES WITHOUT SERIOUS COMORBIDITY WITH BODY MASS INDEX (BMI) OF 33.0 TO 33.9 IN ADULT: ICD-10-CM

## 2023-11-15 DIAGNOSIS — R00.1 BRADYCARDIA: ICD-10-CM

## 2023-11-15 DIAGNOSIS — I87.2 VENOUS REFLUX: Primary | ICD-10-CM

## 2023-11-15 PROCEDURE — 93000 ELECTROCARDIOGRAM COMPLETE: CPT | Performed by: NURSE PRACTITIONER

## 2023-11-15 PROCEDURE — 99214 OFFICE O/P EST MOD 30 MIN: CPT | Performed by: NURSE PRACTITIONER

## 2023-11-15 ASSESSMENT — ENCOUNTER SYMPTOMS: SHORTNESS OF BREATH: 0

## 2023-11-15 NOTE — PROGRESS NOTES
[place] ([facility] PHYSICAL REHABILITATION [address], [address]  Phone: [phone]    Fax [phone]                  Angie Noe MD, Gama Cisse MD, Maxime Escobra MD, MD Ofelia Lyons MD, Deirdre Santana MD, Min Llamas MD, [address] [place] [address] Speaker, DEWAYNE Chou [facility], [address] [facility], [facility]        Cardiology Progress Note      11/15/2023    RE: Nikunj Etienne  (1970)                             Primary cardiologist: Dr. Ofelia Seay       Subjective:  CC:   1. Venous reflux    2. Class 1 obesity due to excess calories without serious comorbidity with body mass index (BMI) of 33.0 to 33.9 in adult    3. Bradycardia        HPI: Nikunj Etienne, who is a  48y.o. year old female with a past medical history as listed below. Patient presents to the office for follow up on venous reflux, bradycardia, and obesity. Patient complains of increased fatigue and low heart rate at home. She had COVID 19 infection last year resulting in prolonged fatigue. She reports the report improved after 6 months but now has returned. She has had multiple thyroid work-ups through PCP which were normal.  Last year she had a stress test and echo at [facility] office which were negative. Patient denies any chest pain, shortness of breath, dizziness, syncope, or palpitations. Past Medical History:   Diagnosis Date    History of stress test 05/11/2021    Hx of echocardiogram 04/30/2021    EF 55%, Mild to moderate MR       Current Outpatient Medications   Medication Sig Dispense Refill    Loratadine-Pseudoephedrine (CLARITIN-D 12 HOUR PO) Take by mouth      EPINEPHrine (ADRENACLICK) 5.09 AK/7.87DK SOAJ injection       Multiple Vitamins-Minerals (WOMENS 50+ MULTI VITAMIN/MIN) TABS Take by mouth       No current facility-administered medications for this visit.        Review of Systems:  Review of Systems

## 2023-11-15 NOTE — PATIENT INSTRUCTIONS
Please be informed that if you contact our office outside of normal business hours the physician on call cannot help with any scheduling or rescheduling issues, procedure instruction questions or any type of medication issue. We advise you for any urgent/emergency that you go to the nearest emergency room! PLEASE CALL OUR OFFICE DURING NORMAL BUSINESS HOURS    Monday - Friday   8 am to 5 pm    [place]: [address] [place]: [phone]    [place]:  [phone]    **It is YOUR responsibilty to bring medication bottles and/or updated medication list to [address] [facility]. This will allow us to better serve you and all your healthcare needs**    Thank you for allowing us to care for you today! We want to ensure we can follow your treatment plan and we strive to give you the best outcomes and experience possible. If you ever have a life threatening emergency and call 911 - for an ambulance (EMS)   Our providers can only care for you at:   [facility] or [facility] ED. Even if you have someone take you or you drive yourself we can only care for you in a [facility] facility. Our providers are not setup at the other healthcare locations! [facility] Laboratory Locations - No appointment necessary. Sites open Monday to Friday. Call your preferred location for test preparation, business   hours and other information you need. [employer] accepts [employer]. [address] [place] [address]. [address] [facility] [place]. [place], [address] [facility]  Phone: [phone]       We are committed to providing you the best care possible. If you receive a survey after visiting one of our offices, please take time to share your experience concerning your physician office visit. These surveys are confidential and no health information about you is shared. We are eager to improve for you and we are counting on your feedback to help make that happen.

## 2024-04-29 ENCOUNTER — HOSPITAL ENCOUNTER (OUTPATIENT)
Age: 54
Setting detail: OBSERVATION
Discharge: HOME | End: 2024-05-01
Attending: STUDENT IN AN ORGANIZED HEALTH CARE EDUCATION/TRAINING PROGRAM | Admitting: STUDENT IN AN ORGANIZED HEALTH CARE EDUCATION/TRAINING PROGRAM
Payer: COMMERCIAL

## 2024-04-29 DIAGNOSIS — R20.0 RIGHT SIDED NUMBNESS: Primary | ICD-10-CM

## 2024-04-29 PROCEDURE — G0378 HOSPITAL OBSERVATION PER HR: HCPCS

## 2024-04-29 PROCEDURE — 2580000003 HC RX 258: Performed by: STUDENT IN AN ORGANIZED HEALTH CARE EDUCATION/TRAINING PROGRAM

## 2024-04-29 PROCEDURE — G0379 DIRECT REFER HOSPITAL OBSERV: HCPCS

## 2024-04-29 RX ORDER — ACETAMINOPHEN 650 MG/1
650 SUPPOSITORY RECTAL EVERY 6 HOURS PRN
Status: DISCONTINUED | OUTPATIENT
Start: 2024-04-29 | End: 2024-05-01 | Stop reason: HOSPADM

## 2024-04-29 RX ORDER — ENOXAPARIN SODIUM 100 MG/ML
30 INJECTION SUBCUTANEOUS 2 TIMES DAILY
Status: DISCONTINUED | OUTPATIENT
Start: 2024-04-29 | End: 2024-05-01 | Stop reason: HOSPADM

## 2024-04-29 RX ORDER — POLYETHYLENE GLYCOL 3350 17 G/17G
17 POWDER, FOR SOLUTION ORAL DAILY PRN
Status: DISCONTINUED | OUTPATIENT
Start: 2024-04-29 | End: 2024-05-01 | Stop reason: HOSPADM

## 2024-04-29 RX ORDER — SODIUM CHLORIDE 9 MG/ML
INJECTION, SOLUTION INTRAVENOUS PRN
Status: DISCONTINUED | OUTPATIENT
Start: 2024-04-29 | End: 2024-05-01 | Stop reason: HOSPADM

## 2024-04-29 RX ORDER — MAGNESIUM SULFATE IN WATER 40 MG/ML
2000 INJECTION, SOLUTION INTRAVENOUS PRN
Status: DISCONTINUED | OUTPATIENT
Start: 2024-04-29 | End: 2024-05-01 | Stop reason: HOSPADM

## 2024-04-29 RX ORDER — SODIUM CHLORIDE 0.9 % (FLUSH) 0.9 %
5-40 SYRINGE (ML) INJECTION EVERY 12 HOURS SCHEDULED
Status: DISCONTINUED | OUTPATIENT
Start: 2024-04-29 | End: 2024-05-01 | Stop reason: HOSPADM

## 2024-04-29 RX ORDER — POTASSIUM CHLORIDE 7.45 MG/ML
10 INJECTION INTRAVENOUS PRN
Status: DISCONTINUED | OUTPATIENT
Start: 2024-04-29 | End: 2024-05-01 | Stop reason: HOSPADM

## 2024-04-29 RX ORDER — ONDANSETRON 2 MG/ML
4 INJECTION INTRAMUSCULAR; INTRAVENOUS EVERY 6 HOURS PRN
Status: DISCONTINUED | OUTPATIENT
Start: 2024-04-29 | End: 2024-05-01 | Stop reason: HOSPADM

## 2024-04-29 RX ORDER — IBUPROFEN 800 MG/1
800 TABLET ORAL EVERY 6 HOURS PRN
COMMUNITY

## 2024-04-29 RX ORDER — ACETAMINOPHEN 325 MG/1
650 TABLET ORAL EVERY 6 HOURS PRN
Status: DISCONTINUED | OUTPATIENT
Start: 2024-04-29 | End: 2024-05-01 | Stop reason: HOSPADM

## 2024-04-29 RX ORDER — ONDANSETRON 4 MG/1
4 TABLET, ORALLY DISINTEGRATING ORAL EVERY 8 HOURS PRN
Status: DISCONTINUED | OUTPATIENT
Start: 2024-04-29 | End: 2024-05-01 | Stop reason: HOSPADM

## 2024-04-29 RX ORDER — POTASSIUM CHLORIDE 20 MEQ/1
40 TABLET, EXTENDED RELEASE ORAL PRN
Status: DISCONTINUED | OUTPATIENT
Start: 2024-04-29 | End: 2024-05-01 | Stop reason: HOSPADM

## 2024-04-29 RX ORDER — SODIUM CHLORIDE 0.9 % (FLUSH) 0.9 %
5-40 SYRINGE (ML) INJECTION PRN
Status: DISCONTINUED | OUTPATIENT
Start: 2024-04-29 | End: 2024-05-01 | Stop reason: HOSPADM

## 2024-04-29 RX ADMIN — SODIUM CHLORIDE, PRESERVATIVE FREE 10 ML: 5 INJECTION INTRAVENOUS at 20:58

## 2024-04-30 LAB
ALBUMIN SERPL-MCNC: 4.3 GM/DL (ref 3.4–5)
ALP BLD-CCNC: 83 IU/L (ref 40–128)
ALT SERPL-CCNC: 19 U/L (ref 10–40)
ANION GAP SERPL CALCULATED.3IONS-SCNC: 13 MMOL/L (ref 7–16)
AST SERPL-CCNC: 16 IU/L (ref 15–37)
BASOPHILS ABSOLUTE: 0 K/CU MM
BASOPHILS RELATIVE PERCENT: 0.3 % (ref 0–1)
BILIRUB SERPL-MCNC: 0.3 MG/DL (ref 0–1)
BUN SERPL-MCNC: 17 MG/DL (ref 6–23)
CALCIUM SERPL-MCNC: 9.2 MG/DL (ref 8.3–10.6)
CHLORIDE BLD-SCNC: 105 MMOL/L (ref 99–110)
CHOLEST SERPL-MCNC: 213 MG/DL
CO2: 25 MMOL/L (ref 21–32)
CREAT SERPL-MCNC: 0.5 MG/DL (ref 0.6–1.1)
DIFFERENTIAL TYPE: ABNORMAL
EOSINOPHILS ABSOLUTE: 0.1 K/CU MM
EOSINOPHILS RELATIVE PERCENT: 1.6 % (ref 0–3)
FOLATE SERPL-MCNC: >20 NG/ML (ref 3.1–17.5)
GFR SERPL CREATININE-BSD FRML MDRD: >90 ML/MIN/1.73M2
GLUCOSE SERPL-MCNC: 89 MG/DL (ref 70–99)
HCT VFR BLD CALC: 38.4 % (ref 37–47)
HDLC SERPL-MCNC: 87 MG/DL
HEMOGLOBIN: 12.9 GM/DL (ref 12.5–16)
IMMATURE NEUTROPHIL %: 0.3 % (ref 0–0.43)
INR BLD: 1 INDEX
LDLC SERPL CALC-MCNC: 114 MG/DL
LYMPHOCYTES ABSOLUTE: 2.3 K/CU MM
LYMPHOCYTES RELATIVE PERCENT: 35.1 % (ref 24–44)
MCH RBC QN AUTO: 28.7 PG (ref 27–31)
MCHC RBC AUTO-ENTMCNC: 33.6 % (ref 32–36)
MCV RBC AUTO: 85.5 FL (ref 78–100)
MONOCYTES ABSOLUTE: 0.6 K/CU MM
MONOCYTES RELATIVE PERCENT: 9.2 % (ref 0–4)
NEUTROPHILS RELATIVE PERCENT: 53.5 % (ref 36–66)
NUCLEATED RBC %: 0 %
PDW BLD-RTO: 13.1 % (ref 11.7–14.9)
PLATELET # BLD: 243 K/CU MM (ref 140–440)
PMV BLD AUTO: 10.9 FL (ref 7.5–11.1)
POTASSIUM SERPL-SCNC: 4.3 MMOL/L (ref 3.5–5.1)
PROTHROMBIN TIME: 13.6 SECONDS (ref 11.7–14.5)
RBC # BLD: 4.49 M/CU MM (ref 4.2–5.4)
SEGMENTED NEUTROPHILS ABSOLUTE COUNT: 3.4 K/CU MM
SODIUM BLD-SCNC: 143 MMOL/L (ref 135–145)
TOTAL IMMATURE NEUTOROPHIL: 0.02 K/CU MM
TOTAL NUCLEATED RBC: 0 K/CU MM
TOTAL PROTEIN: 6.7 GM/DL (ref 6.4–8.2)
TRIGL SERPL-MCNC: 60 MG/DL
TSH SERPL DL<=0.005 MIU/L-ACNC: 1.75 UIU/ML (ref 0.27–4.2)
VITAMIN B-12: 1074 PG/ML (ref 211–911)
WBC # BLD: 6.4 K/CU MM (ref 4–10.5)

## 2024-04-30 PROCEDURE — 36415 COLL VENOUS BLD VENIPUNCTURE: CPT

## 2024-04-30 PROCEDURE — 82746 ASSAY OF FOLIC ACID SERUM: CPT

## 2024-04-30 PROCEDURE — 94761 N-INVAS EAR/PLS OXIMETRY MLT: CPT

## 2024-04-30 PROCEDURE — 85610 PROTHROMBIN TIME: CPT

## 2024-04-30 PROCEDURE — 80061 LIPID PANEL: CPT

## 2024-04-30 PROCEDURE — 84443 ASSAY THYROID STIM HORMONE: CPT

## 2024-04-30 PROCEDURE — 85025 COMPLETE CBC W/AUTO DIFF WBC: CPT

## 2024-04-30 PROCEDURE — 80053 COMPREHEN METABOLIC PANEL: CPT

## 2024-04-30 PROCEDURE — G0378 HOSPITAL OBSERVATION PER HR: HCPCS

## 2024-04-30 PROCEDURE — 82607 VITAMIN B-12: CPT

## 2024-04-30 RX ORDER — LORAZEPAM 2 MG/ML
1 INJECTION INTRAMUSCULAR ONCE
Status: DISCONTINUED | OUTPATIENT
Start: 2024-04-30 | End: 2024-05-01 | Stop reason: HOSPADM

## 2024-04-30 NOTE — CARE COORDINATION
Chart reviewed. From home with spouse. Had surgery on right left 7 weeks ago and uses scooter for mobility. Still healing. Has PCP and insurance. Remains independent of ADLs. No CM needs id'd at this time. [place] home no needs. CM available if needed.   04/30/24 0699   Service Assessment   Patient Orientation Alert and Oriented   Cognition Alert   History Provided By Medical Record   Primary Caregiver Self   Support Systems Spouse/Significant Other   Patient's Healthcare Decision Maker is: Legal Next of Kin   PCP Verified by CM Yes   Prior Functional Level Independent in ADLs/IADLs   Current Functional Level Independent in ADLs/IADLs   Can patient return to prior living arrangement Yes   Ability to make needs known: Good   Family able to assist with home care needs: Yes   Would you like for me to discuss the discharge plan with any other family members/significant others, and if so, who? No   Financial Resources Other (Comment)  (commercial)   Community Resources None   Social/Functional History   Lives With Spouse   Type of Home House

## 2024-04-30 NOTE — PROGRESS NOTES
V2.0  [facility] Hospitalist Progress Note      Name:  Laly Cueto /Age/Sex: 1970  (54 y.o. female)   MRN & CSN:  [id number] & [id number] Encounter Date/Time: 2024 12:51 PM EDT    Location:  [address] [place] [address] PCP: Neymar Silva MD       Hospital Day: 2    Assessment and Plan:   Laly Cueto is a 54 y.o. female with no pmh  who presents with Right sided numbness      Plan:  Strokelike symptoms: Presented with right-sided numbness on the face which started at about 1 PM on  she was also having some numbness in the right thigh and right dorsum of the hand for about 2 days ago, MRI brain without contrast pending, CT head with no acute finding, TSH and B12 within normal limit, patient had history of elevated cholesterol on blood work last year, recheck lipid panel and if elevated will require statin.    Diet ADULT DIET; Regular   DVT Prophylaxis [] Lovenox, []  Heparin, [] SCDs, [] Ambulation,  [] Eliquis, [] Xarelto  [] Coumadin   Code Status Full Code   Disposition From: Home  Expected Disposition: Same  Estimated Date of Discharge: 1 day  Patient requires continued admission due to strokelike symptoms   Surrogate Decision Maker/ POA      Subjective:     Chief Complaint: No chief complaint on file.       Laly Cueto is a 54 y.o. female who presents with right-sided facial numbness and examined at bedside states that that she still has some numbness on the right side of the face but improved from yesterday denies any other active complaints         Review of Systems:    Review of Systems as above      Objective:     Intake/Output Summary (Last 24 hours) at 2024 1251  Last data filed at 2024 1032  Gross per 24 hour   Intake 360 ml   Output --   Net 360 ml        Vitals:   Vitals:    24 1146   BP:    Pulse:    Resp:    Temp:    SpO2: 96%       Physical Exam:   Physical Exam   General: Awake. WDWN.    HEENT: PERRLA. Vision grossly intact. Hearing grossly intact. Oropharynx clear.

## 2024-04-30 NOTE — PROGRESS NOTES
4 Eyes Skin Assessment     NAME:  Laly Cueto  YOB: 1970  MEDICAL RECORD NUMBER:  [id number]    The patient is being assessed for  Admission    I agree that at least one RN has performed a thorough Head to Toe Skin Assessment on the patient. ALL assessment sites listed below have been assessed.      Areas assessed by both nurses:    Head, Face, Ears, Shoulders, Back, Chest, Arms, Elbows, Hands, Sacrum. Buttock, Coccyx, Ischium, Legs. Feet and Heels, and Under Medical Devices         Does the Patient have a Wound? Recent rt foot surgery       Sung Prevention initiated by RN: No  Wound Care Orders initiated by RN: No    Pressure Injury (Stage 3,4, Unstageable, DTI, NWPT, and Complex wounds) if present, place Wound referral order by RN under : No    New Ostomies, if present place, Ostomy referral order under : No     Nurse 1 eSignature: Electronically signed by Kecia Colon RN on 4/29/24 at 10:21 PM EDT    **SHARE this note so that the co-signing nurse can place an eSignature**    Nurse 2 eSignature: Electronically signed by Shayy De Guzman RN on 4/30/24 at 12:34 AM EDT

## 2024-04-30 NOTE — H&P
History and Physical      Name:  Laly Cueto /Age/Sex: 1970  (54 y.o. female)   MRN & CSN:  [id number] & [id number] Encounter Date/Time: 2024 8:02 PM EDT   Location:  [facility] PCP: Neymar Silva MD       Hospital Day: 1    Assessment and Plan:     Patient is a 54 y.o. female who presented with left sided numbness.     Right sided numbness  - Endorsed left sided numbness. LKW 1300 on 24.  - In ED, CTH negative for acute changes or LVO. Initial NIH 1. Unsure if tele-stroke consulted   - On my evaluation, mild right sided facial numbness. Speech is clear, language is fluent and face is symmetrical.    - MRI  - Telemetry  - Fall precautions  - Unable to get CTA as patient has severe anaphylaxis reaction to contrast  - TSH and B12 pending     Advanced directive: full,  is HCPOA  Diet: cardiac  DVT ppx: Lovenox      Disposition: place in observation.  Estimated discharge: 2 day(s).  Current living situation: home.  Expected disposition: home.    Spoke with ED provider who recommended admission for the patient and I agree with that plan.  Personally reviewed lab studies and imaging.  EKG interpreted personally and results as stated above.  Imaging that was interpreted personally and results as stated above.    History of Present Illness:     Chief Complaint:  Right facial numbness     Patient is a 54 y.o. female with no PMHx who presented to the ED with facial numbness, Patient reports she has been having right leg numbness since her surgery approx 7 weeks ago. She states two days ago she began having numbness to the dorsal aspect to her right hand followed by acute onset right sided facial numbness that began at 1300, 30 min prior to presentation to the ED. Denied any F/C, HA, dizziness, presyncope, syncope, cough, SOB, CP, N/V, abdominal pain, bleeding (hemoptysis / hematemesis, hematuria, BRBPR), C/D, or changes in urinary habits.        History obtained from: Patient, ED provider,

## 2024-05-01 ENCOUNTER — APPOINTMENT (OUTPATIENT)
Dept: MRI IMAGING | Age: 54
End: 2024-05-01
Attending: STUDENT IN AN ORGANIZED HEALTH CARE EDUCATION/TRAINING PROGRAM
Payer: COMMERCIAL

## 2024-05-01 ENCOUNTER — APPOINTMENT (OUTPATIENT)
Dept: CT IMAGING | Age: 54
End: 2024-05-01
Attending: STUDENT IN AN ORGANIZED HEALTH CARE EDUCATION/TRAINING PROGRAM
Payer: COMMERCIAL

## 2024-05-01 VITALS
BODY MASS INDEX: 42.03 KG/M2 | SYSTOLIC BLOOD PRESSURE: 122 MMHG | HEIGHT: 62 IN | TEMPERATURE: 98.4 F | OXYGEN SATURATION: 94 % | DIASTOLIC BLOOD PRESSURE: 60 MMHG | RESPIRATION RATE: 18 BRPM | HEART RATE: 94 BPM | WEIGHT: 228.4 LBS

## 2024-05-01 LAB
ALBUMIN SERPL-MCNC: 4.4 GM/DL (ref 3.4–5)
ALP BLD-CCNC: 88 IU/L (ref 40–128)
ALT SERPL-CCNC: 22 U/L (ref 10–40)
ANION GAP SERPL CALCULATED.3IONS-SCNC: 13 MMOL/L (ref 7–16)
AST SERPL-CCNC: 19 IU/L (ref 15–37)
BASOPHILS ABSOLUTE: 0 K/CU MM
BASOPHILS RELATIVE PERCENT: 0.3 % (ref 0–1)
BILIRUB SERPL-MCNC: 0.3 MG/DL (ref 0–1)
BUN SERPL-MCNC: 17 MG/DL (ref 6–23)
CALCIUM SERPL-MCNC: 9.3 MG/DL (ref 8.3–10.6)
CHLORIDE BLD-SCNC: 102 MMOL/L (ref 99–110)
CO2: 23 MMOL/L (ref 21–32)
CREAT SERPL-MCNC: 0.5 MG/DL (ref 0.6–1.1)
DIFFERENTIAL TYPE: ABNORMAL
EOSINOPHILS ABSOLUTE: 0.1 K/CU MM
EOSINOPHILS RELATIVE PERCENT: 1.4 % (ref 0–3)
GFR, ESTIMATED: >90 ML/MIN/1.73M2
GLUCOSE SERPL-MCNC: 91 MG/DL (ref 70–99)
HCT VFR BLD CALC: 41 % (ref 37–47)
HEMOGLOBIN: 13.3 GM/DL (ref 12.5–16)
IMMATURE NEUTROPHIL %: 0.3 % (ref 0–0.43)
LYMPHOCYTES ABSOLUTE: 2.5 K/CU MM
LYMPHOCYTES RELATIVE PERCENT: 36.4 % (ref 24–44)
MCH RBC QN AUTO: 28.1 PG (ref 27–31)
MCHC RBC AUTO-ENTMCNC: 32.4 % (ref 32–36)
MCV RBC AUTO: 86.7 FL (ref 78–100)
MONOCYTES ABSOLUTE: 0.8 K/CU MM
MONOCYTES RELATIVE PERCENT: 10.9 % (ref 0–4)
NEUTROPHILS ABSOLUTE: 3.5 K/CU MM
NEUTROPHILS RELATIVE PERCENT: 50.7 % (ref 36–66)
NUCLEATED RBC %: 0 %
PDW BLD-RTO: 13.2 % (ref 11.7–14.9)
PLATELET # BLD: 257 K/CU MM (ref 140–440)
PMV BLD AUTO: 10.6 FL (ref 7.5–11.1)
POTASSIUM SERPL-SCNC: 4.1 MMOL/L (ref 3.5–5.1)
RBC # BLD: 4.73 M/CU MM (ref 4.2–5.4)
SODIUM BLD-SCNC: 138 MMOL/L (ref 135–145)
TOTAL IMMATURE NEUTOROPHIL: 0.02 K/CU MM
TOTAL NUCLEATED RBC: 0 K/CU MM
TOTAL PROTEIN: 6.9 GM/DL (ref 6.4–8.2)
WBC # BLD: 7 K/CU MM (ref 4–10.5)

## 2024-05-01 PROCEDURE — 80053 COMPREHEN METABOLIC PANEL: CPT

## 2024-05-01 PROCEDURE — 6370000000 HC RX 637 (ALT 250 FOR IP): Performed by: STUDENT IN AN ORGANIZED HEALTH CARE EDUCATION/TRAINING PROGRAM

## 2024-05-01 PROCEDURE — G0378 HOSPITAL OBSERVATION PER HR: HCPCS

## 2024-05-01 PROCEDURE — 36415 COLL VENOUS BLD VENIPUNCTURE: CPT

## 2024-05-01 PROCEDURE — 70450 CT HEAD/BRAIN W/O DYE: CPT

## 2024-05-01 PROCEDURE — 85025 COMPLETE CBC W/AUTO DIFF WBC: CPT

## 2024-05-01 PROCEDURE — 94761 N-INVAS EAR/PLS OXIMETRY MLT: CPT

## 2024-05-01 RX ORDER — LORAZEPAM 1 MG/1
1 TABLET ORAL ONCE
Status: COMPLETED | OUTPATIENT
Start: 2024-05-01 | End: 2024-05-01

## 2024-05-01 RX ORDER — ROSUVASTATIN CALCIUM 20 MG/1
20 TABLET, COATED ORAL DAILY
Qty: 90 TABLET | Refills: 1 | Status: SHIPPED | OUTPATIENT
Start: 2024-05-01

## 2024-05-01 RX ADMIN — LORAZEPAM 1 MG: 1 TABLET ORAL at 11:52

## 2024-05-03 ENCOUNTER — TELEPHONE (OUTPATIENT)
Dept: CARDIOLOGY CLINIC | Age: 54
End: 2024-05-03

## 2024-05-03 NOTE — TELEPHONE ENCOUNTER
Patient called she was Luis Armando gavinmildred she had a TIA  And that she needs a MRI she had to have a   Pin removed from her foot 5/2 before she could get   MRI , she has a bad experience @ [facility]   No one knew what they were doing , she will need at  [facility] for [facility]  MRI still has  a screw in her   Foot from recent foot surgery , she is wanting   This asap please advise.

## 2024-08-20 ENCOUNTER — OFFICE VISIT (OUTPATIENT)
Dept: NEUROLOGY | Age: 54
End: 2024-08-20
Payer: COMMERCIAL

## 2024-08-20 VITALS
BODY MASS INDEX: 36.14 KG/M2 | HEART RATE: 81 BPM | DIASTOLIC BLOOD PRESSURE: 82 MMHG | WEIGHT: 196.4 LBS | OXYGEN SATURATION: 97 % | HEIGHT: 62 IN | SYSTOLIC BLOOD PRESSURE: 124 MMHG

## 2024-08-20 DIAGNOSIS — G56.31 RADIAL NEUROPATHY, RIGHT: Primary | ICD-10-CM

## 2024-08-20 PROCEDURE — 99205 OFFICE O/P NEW HI 60 MIN: CPT | Performed by: PSYCHIATRY & NEUROLOGY

## 2024-08-20 RX ORDER — ASPIRIN 81 MG/1
81 TABLET ORAL DAILY
COMMUNITY

## 2024-08-20 NOTE — PROGRESS NOTES
fasciculation of the tongue, tongue protrudes midline, normal power to left, and normal power to right  Gait and Stance   Gait/Posture: station normal, ambulates independently, gait normal, and Romberg's test normal  Motor/Coordination Exam   General: no bradykinesia, no tremors, no chorea, no athetosis, no myoclonus, and no dyskinesia   Right Upper Extremity: normal motor strength, normal bulk, and normal tone   Left Upper Extremity: normal motor strength, normal bulk, and normal tone   Right Lower Extremity: normal motor strength, normal bulk, and normal tone   Left Lower Extremity: normal motor strength, normal bulk, and normal tone   Coordination: no drift, normal finger-to-nose, and rapid alternating movements normal  Reflexes   Reflexes Right: DTRS are normal throughout   Reflexes Left: DTRS are normal throughout   Plantar Reflex Right: response downgoing   Plantar Reflex Left: response downgoing   Hoffmans Reflex Right: absent   Hoffmans Reflex Left: absent  Sensory   Sensation: normal light touch, normal temperature, normal vibration, normal position, normal DSS, no neglect, decreased pinprick LUE radial, and decreased light touch right anterior lateral thigh  Spine   Cervical Spine: no tenderness, no dystonia , and full ROM   Thoracic Spine: no spasms, no bony abnormalities, normal curvature, no tenderness, and full ROM   Low Back: full ROM, no pain, no spasms, and no bony abnormalities  Lungs   Auscultation: normal breath sounds  Skin   Inspection: no jaundice, no lesions, no rashes, and no cyanosis      /82 (Site: Left Upper Arm, Position: Sitting, Cuff Size: Large Adult)   Pulse 81   Ht 1.575 m (5' 2\")   Wt 89.1 kg (196 lb 6.4 oz)   SpO2 97%   BMI 35.92 kg/m²     Assessment and Plan     Diagnosis Orders   1. Radial neuropathy, right  Nerve Conduction Test with EMG          Laly had an atypical progression of right sided numbness about 6 weeks after her foot surgery.  I do feel that each area

## 2024-09-27 ENCOUNTER — OFFICE VISIT (OUTPATIENT)
Dept: CARDIOLOGY CLINIC | Age: 54
End: 2024-09-27
Payer: COMMERCIAL

## 2024-09-27 VITALS
SYSTOLIC BLOOD PRESSURE: 118 MMHG | OXYGEN SATURATION: 98 % | HEART RATE: 72 BPM | HEIGHT: 63 IN | DIASTOLIC BLOOD PRESSURE: 76 MMHG | BODY MASS INDEX: 35.61 KG/M2 | WEIGHT: 201 LBS

## 2024-09-27 DIAGNOSIS — R00.1 BRADYCARDIA: ICD-10-CM

## 2024-09-27 DIAGNOSIS — E66.09 CLASS 1 OBESITY DUE TO EXCESS CALORIES WITHOUT SERIOUS COMORBIDITY WITH BODY MASS INDEX (BMI) OF 33.0 TO 33.9 IN ADULT: ICD-10-CM

## 2024-09-27 DIAGNOSIS — I87.2 VENOUS REFLUX: Primary | ICD-10-CM

## 2024-09-27 PROCEDURE — 99214 OFFICE O/P EST MOD 30 MIN: CPT | Performed by: INTERNAL MEDICINE

## 2024-09-27 RX ORDER — IBUPROFEN 200 MG
1 CAPSULE ORAL DAILY
COMMUNITY

## 2024-10-17 ENCOUNTER — TELEPHONE (OUTPATIENT)
Dept: CARDIOLOGY CLINIC | Age: 54
End: 2024-10-17

## 2024-10-18 ENCOUNTER — TELEPHONE (OUTPATIENT)
Dept: CARDIOLOGY CLINIC | Age: 54
End: 2024-10-18

## 2024-10-18 NOTE — TELEPHONE ENCOUNTER
Spk to pt and was told that she did not have DVT but had reflux.   Told her to wear compression sox.  Pt voiced understanding.        No evidence of deep vein thrombosis in the bilateral lower extremities.    Minimal chronic non-occlusive superficial vein thrombosis in the calf region of the bilateral great saphenous veis.    Significant venous reflux noted in the Right GSV Knee (2.0s), and GSV Mid Calf Tributary (1.9s, mildly tortuous).    Significant venous reflux noted in the Left CFV (1.3s), and SSV Distal Calf (2.6s).    The Right SSV is too small to assess for venous reflux.     Recommend compression socks

## 2025-01-27 ENCOUNTER — OFFICE VISIT (OUTPATIENT)
Dept: CARDIOLOGY CLINIC | Age: 55
End: 2025-01-27
Payer: COMMERCIAL

## 2025-01-27 VITALS
SYSTOLIC BLOOD PRESSURE: 120 MMHG | OXYGEN SATURATION: 97 % | WEIGHT: 205 LBS | HEART RATE: 89 BPM | HEIGHT: 63 IN | DIASTOLIC BLOOD PRESSURE: 82 MMHG | BODY MASS INDEX: 36.32 KG/M2

## 2025-01-27 DIAGNOSIS — R00.1 BRADYCARDIA: Primary | ICD-10-CM

## 2025-01-27 DIAGNOSIS — R00.2 PALPITATIONS: ICD-10-CM

## 2025-01-27 DIAGNOSIS — R07.9 CHEST PAIN, UNSPECIFIED TYPE: ICD-10-CM

## 2025-01-27 PROCEDURE — 99214 OFFICE O/P EST MOD 30 MIN: CPT | Performed by: INTERNAL MEDICINE

## 2025-01-27 PROCEDURE — 93000 ELECTROCARDIOGRAM COMPLETE: CPT | Performed by: INTERNAL MEDICINE

## 2025-01-27 RX ORDER — LORATADINE PSEUDOEPHEDRINE SULFATE 10; 240 MG/1; MG/1
1 TABLET, EXTENDED RELEASE ORAL DAILY
COMMUNITY

## 2025-01-27 RX ORDER — FLUTICASONE PROPIONATE 50 MCG
2 SPRAY, SUSPENSION (ML) NASAL DAILY PRN
COMMUNITY
Start: 2024-05-21 | End: 2025-05-21

## 2025-01-27 NOTE — PROGRESS NOTES
Sangeeta Alanis MD        OFFICE  FOLLOWUP NOTE    Chief complaints: patient is here for management of varicose vein, dyslipdiemia, bradycarida,     Subjective: patient feels better, + chest pain, no shortness of breath, no dizziness, few palpitations    HPI Laly is a 54 y.o.year old who  has a past medical history of History of stress test and Hx of echocardiogram. and presents for management of varicose vein, dyslipdiemia, bradycarida,  which are well controlled      Current Outpatient Medications   Medication Sig Dispense Refill    fluticasone (FLONASE) 50 MCG/ACT nasal spray 2 sprays by Nasal route daily as needed      loratadine-pseudoephedrine (CLARITIN-D 24 HOUR)  MG per extended release tablet Take 1 tablet by mouth daily      calcium citrate (CALCITRATE) 950 (200 Ca) MG tablet Take 1 tablet by mouth daily      VITAMIN E PO Take by mouth daily      aspirin 81 MG EC tablet Take 1 tablet by mouth daily 6 tabs tid      Flaxseed Oil OIL by Does not apply route daily      Multiple Vitamins-Minerals (WOMENS 50+ MULTI VITAMIN/MIN) TABS Take by mouth       No current facility-administered medications for this visit.     Allergies: Iodine, Shellfish allergy, and Zolpidem tartrate  Past Medical History:   Diagnosis Date    History of stress test 2021    Hx of echocardiogram 2021    EF 55%, Mild to moderate MR     No past surgical history on file.  Family History   Problem Relation Age of Onset    Heart Surgery Mother     Hypertension Father     High Cholesterol Father      Social History     Tobacco Use    Smoking status: Former     Current packs/day: 0.00     Types: Cigarettes     Quit date: 10/1995     Years since quittin.3    Smokeless tobacco: Never   Substance Use Topics    Alcohol use: Yes     Alcohol/week: 2.0 standard drinks of alcohol     Types: 2 Shots of liquor per week     Comment: alcohol here and then. caffeine 2 cups of coffee a day      [unfilled]  Review of

## 2025-02-07 ENCOUNTER — TELEPHONE (OUTPATIENT)
Dept: CARDIOLOGY CLINIC | Age: 55
End: 2025-02-07

## 2025-02-07 NOTE — TELEPHONE ENCOUNTER
LM with normal results         Nuclear lexiscan stress test with myocardial perfusion       Stress Combined Conclusion: Normal pharmacological myocardial perfusion study. Findings suggest a low risk of cardiac events.    Perfusion Comments: LV perfusion is normal. There is no evidence of inducible ischemia.    Perfusion Defect: There is a left ventricular stress perfusion defect that is small in size present in the anterior segment(s). The defect appears to be an artifact caused by breast attenuation.    Perfusion Conclusion: There is no evidence of transient ischemic dilation (TID).    ECG: The stress ECG was negative for ischemia.    Image quality is good.    Stress Test: A pharmacological stress test was performed using regadenoson (Lexiscan). PO caffeine given as a reversal agent. Hemodynamics are adequate for diagnosis. Blood pressure demonstrated a normal response and heart rate demonstrated a normal response to stress.      Echo (TTE) complete     Left Ventricle: Normal left ventricular systolic function with a visually estimated EF of 55 - 60%. Left ventricle size is normal. Normal wall thickness. Normal wall motion. Grade I diastolic dysfunction with normal LAP.    No significant valvular disease or regurgitation noted.    Pericardium: No pericardial effusion.    Image quality is good.

## 2025-02-25 ENCOUNTER — OFFICE VISIT (OUTPATIENT)
Dept: CARDIOLOGY CLINIC | Age: 55
End: 2025-02-25

## 2025-02-25 VITALS
OXYGEN SATURATION: 98 % | DIASTOLIC BLOOD PRESSURE: 82 MMHG | SYSTOLIC BLOOD PRESSURE: 122 MMHG | HEIGHT: 62 IN | HEART RATE: 68 BPM | BODY MASS INDEX: 38.39 KG/M2 | WEIGHT: 208.6 LBS

## 2025-02-25 DIAGNOSIS — I87.2 VENOUS REFLUX: ICD-10-CM

## 2025-02-25 DIAGNOSIS — E66.811 CLASS 1 OBESITY DUE TO EXCESS CALORIES WITHOUT SERIOUS COMORBIDITY WITH BODY MASS INDEX (BMI) OF 33.0 TO 33.9 IN ADULT: ICD-10-CM

## 2025-02-25 DIAGNOSIS — R00.2 PALPITATIONS: ICD-10-CM

## 2025-02-25 DIAGNOSIS — R00.1 BRADYCARDIA: Primary | ICD-10-CM

## 2025-02-25 DIAGNOSIS — E66.09 CLASS 1 OBESITY DUE TO EXCESS CALORIES WITHOUT SERIOUS COMORBIDITY WITH BODY MASS INDEX (BMI) OF 33.0 TO 33.9 IN ADULT: ICD-10-CM

## 2025-02-25 DIAGNOSIS — R07.9 CHEST PAIN, UNSPECIFIED TYPE: ICD-10-CM

## 2025-02-25 RX ORDER — ALBUTEROL SULFATE 90 UG/1
1-2 INHALANT RESPIRATORY (INHALATION)
COMMUNITY
Start: 2025-02-23 | End: 2025-03-25

## 2025-02-25 RX ORDER — DEXTROMETHORPHAN HYDROBROMIDE AND PROMETHAZINE HYDROCHLORIDE 15; 6.25 MG/5ML; MG/5ML
5 SYRUP ORAL NIGHTLY PRN
COMMUNITY
Start: 2025-02-23 | End: 2025-03-02

## 2025-02-25 NOTE — PROGRESS NOTES
CLINICAL STAFF DOCUMENTATION    Dr. Sangeeta Cueto  1970  [id number]    Have you had any Chest Pain recently? - No  Have you had any Shortness of Breath - No  Have you had any dizziness - No  Have you had any palpitations recently? - No  Do you have any edema - swelling in No    When did you have your last labs drawn 5/1/2024  What doctor ordered Sami  Do we have the labs in their chart Yes  Do you have a surgery or procedure scheduled in the near future - No  Do use tobacco products? - No  Do you drink alcohol? - Yes occasionally  Do you use any illicit drugs? - No  Caffeine? - Yes  How much caffeine? .2  cups coffee      Check medication list thoroughly!!! AND RECONCILE OUTSIDE MEDICATIONS  If dose has changed change the entire order not just the MG  BE SURE TO ASK PATIENT IF THEY NEED MEDICATION REFILLS  Verify Pharmacy and update if incorrect    Add to every patient's \"wrap up\" the following dot phrase AFTERVISITCARDIOHEARTHOUSE and ensure we explain this to our patients

## 2025-02-25 NOTE — PATIENT INSTRUCTIONS
Thank you for allowing us to care for you today!   We want to ensure we can follow your treatment plan and we strive to give you the best outcomes and experience possible.   If you ever have a life threatening emergency and call 911 - for an ambulance (EMS)  REMEMBER  Our providers can only care for you at:   [facility] or [facility]   Even if you have someone take you or you drive yourself we can only care for you in a [facility] facility. Our providers are not setup at the other healthcare locations!    PLEASE CALL OUR OFFICE DURING NORMAL BUSINESS HOURS  Monday through Friday 8 am to 5 pm  AFTER HOURS the physician on-call cannot help with scheduling, rescheduling, procedure instruction questions or any type of medication need or issue.  [facility] P:[phone] - [facility] P:[phone] - [facility] P:[phone]      If you receive a survey:  We would appreciate you taking the time to share your experience concerning your provider visit in the office.    These surveys are confidential!  We are eager to improve and are counting on you to share your feedback so we can ensure you get the best care possible.

## 2025-02-25 NOTE — PROGRESS NOTES
Sangeeta Alanis MD        OFFICE  FOLLOWUP NOTE    Chief complaints: patient is here for management of  varicose vein, dyslipdiemia, bradycarida,     Subjective: patient feels has bronchitis, no chest pain, no shortness of breath, no dizziness, no palpitations    HPI Laly is a 54 y.o.year old who  has a past medical history of History of stress test and Hx of echocardiogram. and presents for management of  varicose vein, dyslipdiemia, bradycarida,  which are well controlled      Current Outpatient Medications   Medication Sig Dispense Refill    albuterol sulfate HFA (PROVENTIL;VENTOLIN;PROAIR) 108 (90 Base) MCG/ACT inhaler Inhale 1-2 puffs into the lungs      Pseudoephedrine-DM-GG 60- MG TABS Take 1 capsule by mouth every 6 hours as needed      promethazine-dextromethorphan (PROMETHAZINE-DM) 6.25-15 MG/5ML syrup Take 5 mLs by mouth nightly as needed      fluticasone (FLONASE) 50 MCG/ACT nasal spray 2 sprays by Nasal route daily as needed      calcium citrate (CALCITRATE) 950 (200 Ca) MG tablet Take 1 tablet by mouth daily      VITAMIN E PO Take by mouth daily      aspirin 81 MG EC tablet Take 1 tablet by mouth daily 6 tabs tid      Flaxseed Oil OIL by Does not apply route daily      Multiple Vitamins-Minerals (WOMENS 50+ MULTI VITAMIN/MIN) TABS Take by mouth      loratadine-pseudoephedrine (CLARITIN-D 24 HOUR)  MG per extended release tablet Take 1 tablet by mouth daily (Patient not taking: Reported on 2/25/2025)       No current facility-administered medications for this visit.     Allergies: Iodine, Shellfish allergy, and Zolpidem tartrate  Past Medical History:   Diagnosis Date    History of stress test 05/11/2021    Hx of echocardiogram 04/30/2021    EF 55%, Mild to moderate MR     No past surgical history on file.  Family History   Problem Relation Age of Onset    Heart Surgery Mother     Hypertension Father     High Cholesterol Father      Social History     Tobacco Use

## 2025-06-05 ENCOUNTER — TELEPHONE (OUTPATIENT)
Dept: CARDIOLOGY CLINIC | Age: 55
End: 2025-06-05